# Patient Record
Sex: FEMALE | Race: OTHER
[De-identification: names, ages, dates, MRNs, and addresses within clinical notes are randomized per-mention and may not be internally consistent; named-entity substitution may affect disease eponyms.]

---

## 2017-12-14 ENCOUNTER — HOSPITAL ENCOUNTER (EMERGENCY)
Dept: HOSPITAL 12 - ER | Age: 37
Discharge: HOME | End: 2017-12-14
Payer: COMMERCIAL

## 2017-12-14 VITALS — WEIGHT: 160 LBS | HEIGHT: 63 IN | BODY MASS INDEX: 28.35 KG/M2

## 2017-12-14 VITALS — DIASTOLIC BLOOD PRESSURE: 83 MMHG | SYSTOLIC BLOOD PRESSURE: 140 MMHG

## 2017-12-14 DIAGNOSIS — M54.5: ICD-10-CM

## 2017-12-14 DIAGNOSIS — K21.9: ICD-10-CM

## 2017-12-14 DIAGNOSIS — E03.9: ICD-10-CM

## 2017-12-14 DIAGNOSIS — B96.89: ICD-10-CM

## 2017-12-14 DIAGNOSIS — N39.0: Primary | ICD-10-CM

## 2017-12-14 DIAGNOSIS — F17.200: ICD-10-CM

## 2017-12-14 DIAGNOSIS — F42.9: ICD-10-CM

## 2017-12-14 LAB
APPEARANCE UR: (no result)
BASOPHILS # BLD AUTO: 0 K/UL (ref 0–8)
BASOPHILS NFR BLD AUTO: 0.7 % (ref 0–2)
BILIRUB UR QL STRIP: NEGATIVE
COLOR UR: YELLOW
DEPRECATED SQUAMOUS URNS QL MICRO: (no result) /HPF
EOSINOPHIL # BLD AUTO: 0.2 K/UL (ref 0–0.7)
EOSINOPHIL NFR BLD AUTO: 3.2 % (ref 0–7)
EOSINOPHIL NFR BLD MANUAL: 3 % (ref 0–8)
GLUCOSE UR STRIP-MCNC: NEGATIVE MG/DL
HCG UR QL: NEGATIVE
HCT VFR BLD AUTO: 28.5 % (ref 31.2–41.9)
HGB BLD-MCNC: 9.1 G/DL (ref 10.9–14.3)
HGB UR QL STRIP: NEGATIVE
KETONES UR STRIP-MCNC: NEGATIVE MG/DL
LEUKOCYTE ESTERASE UR QL STRIP: (no result)
LYMPHOCYTES # BLD AUTO: 2.3 K/UL (ref 20–40)
LYMPHOCYTES NFR BLD AUTO: 32.3 % (ref 20.5–51.5)
LYMPHOCYTES NFR BLD MANUAL: 29 % (ref 20–40)
MCH RBC QN AUTO: 23 UUG (ref 24.7–32.8)
MCHC RBC AUTO-ENTMCNC: 32 G/DL (ref 32.3–35.6)
MCV RBC AUTO: 71.8 FL (ref 75.5–95.3)
MONOCYTES # BLD AUTO: 0.6 K/UL (ref 2–10)
MONOCYTES NFR BLD AUTO: 8.8 % (ref 0–11)
MONOCYTES NFR BLD MANUAL: 9 % (ref 2–10)
MUCOUS THREADS URNS QL MICRO: (no result) /LPF
NEUTROPHILS # BLD AUTO: 4 K/UL (ref 1.8–8.9)
NEUTROPHILS NFR BLD AUTO: 55 % (ref 38.5–71.5)
NEUTS BAND NFR BLD MANUAL: 3 % (ref 0–10)
NEUTS SEG NFR BLD MANUAL: 56 % (ref 42–75)
NITRITE UR QL STRIP: NEGATIVE
PH UR STRIP: 6 [PH] (ref 5–8)
PLATELET # BLD AUTO: 278 K/UL (ref 179–408)
PROT UR QL STRIP: (no result)
RBC # BLD AUTO: 3.96 MIL/UL (ref 3.63–4.92)
RBC #/AREA URNS HPF: (no result) /HPF (ref 0–3)
SP GR UR STRIP: 1.03 (ref 1–1.03)
UROBILINOGEN UR STRIP-MCNC: 0.2 E.U./DL
WBC # BLD AUTO: 7.2 K/UL (ref 3.8–11.8)
WBC #/AREA URNS HPF: (no result) /HPF
WBC #/AREA URNS HPF: (no result) /HPF (ref 0–3)

## 2017-12-14 PROCEDURE — A4663 DIALYSIS BLOOD PRESSURE CUFF: HCPCS

## 2018-03-02 ENCOUNTER — HOSPITAL ENCOUNTER (EMERGENCY)
Dept: HOSPITAL 12 - ER | Age: 38
LOS: 1 days | Discharge: HOME | End: 2018-03-03
Payer: COMMERCIAL

## 2018-03-02 VITALS — HEIGHT: 63 IN | WEIGHT: 150 LBS | BODY MASS INDEX: 26.58 KG/M2

## 2018-03-02 DIAGNOSIS — Z79.1: ICD-10-CM

## 2018-03-02 DIAGNOSIS — J10.1: Primary | ICD-10-CM

## 2018-03-02 DIAGNOSIS — Z79.899: ICD-10-CM

## 2018-03-02 DIAGNOSIS — K21.9: ICD-10-CM

## 2018-03-02 DIAGNOSIS — F17.210: ICD-10-CM

## 2018-03-02 DIAGNOSIS — E03.9: ICD-10-CM

## 2018-03-02 LAB
APPEARANCE UR: CLEAR
BILIRUB UR QL STRIP: NEGATIVE
COLOR UR: YELLOW
DEPRECATED SQUAMOUS URNS QL MICRO: (no result) /HPF
GLUCOSE UR STRIP-MCNC: NEGATIVE MG/DL
HCG UR QL: NEGATIVE
HGB UR QL STRIP: NEGATIVE
KETONES UR STRIP-MCNC: (no result) MG/DL
LEUKOCYTE ESTERASE UR QL STRIP: NEGATIVE
NITRITE UR QL STRIP: NEGATIVE
PH UR STRIP: 6 [PH] (ref 5–8)
PROT UR QL STRIP: (no result)
RBC #/AREA URNS HPF: (no result) /HPF (ref 0–3)
SP GR UR STRIP: 1.02 (ref 1–1.03)
UROBILINOGEN UR STRIP-MCNC: 0.2 E.U./DL
WBC #/AREA URNS HPF: (no result) /HPF
WBC #/AREA URNS HPF: (no result) /HPF (ref 0–3)

## 2018-03-02 PROCEDURE — A4663 DIALYSIS BLOOD PRESSURE CUFF: HCPCS

## 2018-03-03 VITALS — DIASTOLIC BLOOD PRESSURE: 79 MMHG | SYSTOLIC BLOOD PRESSURE: 120 MMHG

## 2018-03-03 NOTE — NUR
Patient discharged to home in stable conditon.  Written and verbal after care 
instructions given. 

Patient verbalizes understanding of instructions. Patient ambulated out of ER 
with steady gait, lab records provided per patient request, VSS, no acute signs 
of distress.

## 2018-05-11 ENCOUNTER — HOSPITAL ENCOUNTER (EMERGENCY)
Dept: HOSPITAL 12 - ER | Age: 38
Discharge: HOME | End: 2018-05-11
Payer: COMMERCIAL

## 2018-05-11 VITALS — WEIGHT: 152 LBS | HEIGHT: 63 IN | BODY MASS INDEX: 26.93 KG/M2

## 2018-05-11 VITALS — SYSTOLIC BLOOD PRESSURE: 115 MMHG | DIASTOLIC BLOOD PRESSURE: 65 MMHG

## 2018-05-11 DIAGNOSIS — E03.9: ICD-10-CM

## 2018-05-11 DIAGNOSIS — L03.114: ICD-10-CM

## 2018-05-11 DIAGNOSIS — Z79.899: ICD-10-CM

## 2018-05-11 DIAGNOSIS — F17.210: ICD-10-CM

## 2018-05-11 DIAGNOSIS — Z79.1: ICD-10-CM

## 2018-05-11 DIAGNOSIS — K21.9: ICD-10-CM

## 2018-05-11 DIAGNOSIS — L03.113: Primary | ICD-10-CM

## 2018-05-11 PROCEDURE — A4663 DIALYSIS BLOOD PRESSURE CUFF: HCPCS

## 2018-05-11 NOTE — NUR
Patient discharged to home in stable conditon.  Written and verbal after care 
instructions given. 

Patient verbalizes understanding of instructions. Patient ambulated out of ER 
with steady gait, no acute signs of distress, VSS, all belongings taken, IV 
site discontinued.

## 2018-05-11 NOTE — NUR
Patient concerned about increasing pain and redness on arms, reports has 
trouble getting pain medications in [facility], requesting for more pain meds and has 
some questions for the doctor. MD notified.

## 2018-05-14 ENCOUNTER — HOSPITAL ENCOUNTER (EMERGENCY)
Dept: HOSPITAL 12 - ER | Age: 38
LOS: 1 days | Discharge: HOME | End: 2018-05-15
Payer: SELF-PAY

## 2018-05-14 VITALS — HEIGHT: 63 IN | BODY MASS INDEX: 26.58 KG/M2 | WEIGHT: 150 LBS

## 2018-05-14 DIAGNOSIS — L02.414: Primary | ICD-10-CM

## 2018-05-14 DIAGNOSIS — E03.9: ICD-10-CM

## 2018-05-14 DIAGNOSIS — K21.9: ICD-10-CM

## 2018-05-14 DIAGNOSIS — F17.200: ICD-10-CM

## 2018-05-14 DIAGNOSIS — L02.413: ICD-10-CM

## 2018-05-14 PROCEDURE — A4663 DIALYSIS BLOOD PRESSURE CUFF: HCPCS

## 2018-05-14 NOTE — NUR
Patient is AAox4. Able to speak in complete sentences. Responsive to verbal and 
tactile stimuli. Patient states she was given a prescription for Adderall 1  
month ago. She began picking at her skin, and developed multiple areas of open 
skin to both arms. She has bilat. forearm abscess at this time. She was seen 
recently at this ER for the same issue, and was given antibiotics. The 
medication is helping the patient per her statement. She comes in now 
requesting I&D of both abscess , and requesting analgesics. Patient denies 
fevers, vomiting, nausea, chest pain, respiratory distress at this time. 
Respirations even and unlabored. no cardiovascular distress noted. no GI/ 
distress noted. Will continue to monitor patient.

## 2018-05-15 VITALS — SYSTOLIC BLOOD PRESSURE: 137 MMHG | DIASTOLIC BLOOD PRESSURE: 82 MMHG

## 2018-05-15 NOTE — NUR
Patient discharged to home in stable conditon.  Written and verbal after care 
instructions given. 

Patient verbalizes understanding of instructions. Ambulated from Er with stable 
gait. All belongings with patient. Patient to be driven home by  in 
private vehicle.

## 2018-05-16 ENCOUNTER — HOSPITAL ENCOUNTER (EMERGENCY)
Dept: HOSPITAL 12 - ER | Age: 38
Discharge: HOME | End: 2018-05-16
Payer: SELF-PAY

## 2018-05-16 VITALS — DIASTOLIC BLOOD PRESSURE: 71 MMHG | SYSTOLIC BLOOD PRESSURE: 133 MMHG

## 2018-05-16 VITALS — HEIGHT: 63 IN | WEIGHT: 145 LBS | BODY MASS INDEX: 25.69 KG/M2

## 2018-05-16 DIAGNOSIS — L03.114: Primary | ICD-10-CM

## 2018-05-16 DIAGNOSIS — F17.200: ICD-10-CM

## 2018-05-16 PROCEDURE — A4217 STERILE WATER/SALINE, 500 ML: HCPCS

## 2018-05-16 PROCEDURE — A4663 DIALYSIS BLOOD PRESSURE CUFF: HCPCS

## 2018-05-16 NOTE — NUR
Patient discharged to home in stable conditon.  Written and verbal after care 
instructions given. 

Patient verbalizes understanding of instructions.  Patient left ER and walks in 
steady gait.  Accompanied by mother who will drive home.  All belongings with 
pt. VSS. No acute distress noted.

## 2020-12-21 ENCOUNTER — HOSPITAL ENCOUNTER (EMERGENCY)
Dept: HOSPITAL 12 - ER | Age: 40
LOS: 1 days | Discharge: HOME | End: 2020-12-22
Payer: COMMERCIAL

## 2020-12-21 VITALS — HEIGHT: 63 IN | BODY MASS INDEX: 35.44 KG/M2 | WEIGHT: 200 LBS

## 2020-12-21 DIAGNOSIS — E03.9: ICD-10-CM

## 2020-12-21 DIAGNOSIS — D64.9: ICD-10-CM

## 2020-12-21 DIAGNOSIS — F42.9: ICD-10-CM

## 2020-12-21 DIAGNOSIS — Z79.890: ICD-10-CM

## 2020-12-21 DIAGNOSIS — F41.9: ICD-10-CM

## 2020-12-21 DIAGNOSIS — Z84.1: ICD-10-CM

## 2020-12-21 DIAGNOSIS — K76.0: ICD-10-CM

## 2020-12-21 DIAGNOSIS — R00.0: ICD-10-CM

## 2020-12-21 DIAGNOSIS — K21.9: ICD-10-CM

## 2020-12-21 DIAGNOSIS — R10.11: Primary | ICD-10-CM

## 2020-12-21 DIAGNOSIS — N20.0: ICD-10-CM

## 2020-12-21 LAB
ALP SERPL-CCNC: 97 U/L (ref 50–136)
ALT SERPL W/O P-5'-P-CCNC: 146 U/L (ref 14–59)
APPEARANCE UR: CLEAR
AST SERPL-CCNC: 61 U/L (ref 15–37)
BASOPHILS # BLD AUTO: 0 K/UL (ref 0–8)
BASOPHILS NFR BLD AUTO: 0.6 % (ref 0–2)
BILIRUB DIRECT SERPL-MCNC: 0.1 MG/DL (ref 0–0.2)
BILIRUB SERPL-MCNC: 0.2 MG/DL (ref 0.2–1)
BILIRUB UR QL STRIP: NEGATIVE
BUN SERPL-MCNC: 19 MG/DL (ref 7–18)
CHLORIDE SERPL-SCNC: 101 MMOL/L (ref 98–107)
CO2 SERPL-SCNC: 21 MMOL/L (ref 21–32)
COLOR UR: YELLOW
CREAT SERPL-MCNC: 1.1 MG/DL (ref 0.6–1.3)
DEPRECATED SQUAMOUS URNS QL MICRO: (no result) /HPF
EOSINOPHIL # BLD AUTO: 0.1 K/UL (ref 0–0.7)
EOSINOPHIL NFR BLD AUTO: 0.8 % (ref 0–7)
GLUCOSE SERPL-MCNC: 132 MG/DL (ref 74–106)
GLUCOSE UR STRIP-MCNC: NEGATIVE MG/DL
HCG UR QL: NEGATIVE
HCT VFR BLD AUTO: 31.1 % (ref 31.2–41.9)
HGB BLD-MCNC: 10 G/DL (ref 10.9–14.3)
HGB UR QL STRIP: NEGATIVE
KETONES UR STRIP-MCNC: (no result) MG/DL
LEUKOCYTE ESTERASE UR QL STRIP: NEGATIVE
LIPASE SERPL-CCNC: 158 U/L (ref 73–393)
LYMPHOCYTES # BLD AUTO: 1.5 K/UL (ref 20–40)
LYMPHOCYTES NFR BLD AUTO: 20.2 % (ref 20.5–51.5)
MCH RBC QN AUTO: 22.1 UUG (ref 24.7–32.8)
MCHC RBC AUTO-ENTMCNC: 32 G/DL (ref 32.3–35.6)
MCV RBC AUTO: 68.9 FL (ref 75.5–95.3)
MONOCYTES # BLD AUTO: 0.5 K/UL (ref 2–10)
MONOCYTES NFR BLD AUTO: 7 % (ref 0–11)
NEUTROPHILS # BLD AUTO: 5.4 K/UL (ref 1.8–8.9)
NEUTROPHILS NFR BLD AUTO: 71.4 % (ref 38.5–71.5)
NITRITE UR QL STRIP: NEGATIVE
PH UR STRIP: 6 [PH] (ref 5–8)
PLATELET # BLD AUTO: 297 K/UL (ref 179–408)
POTASSIUM SERPL-SCNC: 4 MMOL/L (ref 3.5–5.1)
RBC # BLD AUTO: 4.52 MIL/UL (ref 3.63–4.92)
RBC #/AREA URNS HPF: (no result) /HPF (ref 0–3)
SP GR UR STRIP: 1.02 (ref 1–1.03)
UROBILINOGEN UR STRIP-MCNC: 0.2 E.U./DL
WBC # BLD AUTO: 7.6 K/UL (ref 3.8–11.8)
WBC #/AREA URNS HPF: (no result) /HPF
WBC #/AREA URNS HPF: (no result) /HPF (ref 0–3)
WS STN SPEC: 7.8 G/DL (ref 6.4–8.2)

## 2020-12-21 PROCEDURE — 84484 ASSAY OF TROPONIN QUANT: CPT

## 2020-12-21 PROCEDURE — 84703 CHORIONIC GONADOTROPIN ASSAY: CPT

## 2020-12-21 PROCEDURE — 36415 COLL VENOUS BLD VENIPUNCTURE: CPT

## 2020-12-21 PROCEDURE — 76705 ECHO EXAM OF ABDOMEN: CPT

## 2020-12-21 PROCEDURE — 85007 BL SMEAR W/DIFF WBC COUNT: CPT

## 2020-12-21 PROCEDURE — 84443 ASSAY THYROID STIM HORMONE: CPT

## 2020-12-21 PROCEDURE — 96374 THER/PROPH/DIAG INJ IV PUSH: CPT

## 2020-12-21 PROCEDURE — 96376 TX/PRO/DX INJ SAME DRUG ADON: CPT

## 2020-12-21 PROCEDURE — 80048 BASIC METABOLIC PNL TOTAL CA: CPT

## 2020-12-21 PROCEDURE — A4663 DIALYSIS BLOOD PRESSURE CUFF: HCPCS

## 2020-12-21 PROCEDURE — 93005 ELECTROCARDIOGRAM TRACING: CPT

## 2020-12-21 PROCEDURE — 81001 URINALYSIS AUTO W/SCOPE: CPT

## 2020-12-21 PROCEDURE — 80076 HEPATIC FUNCTION PANEL: CPT

## 2020-12-21 PROCEDURE — 96361 HYDRATE IV INFUSION ADD-ON: CPT

## 2020-12-21 PROCEDURE — 85025 COMPLETE CBC W/AUTO DIFF WBC: CPT

## 2020-12-21 PROCEDURE — 83690 ASSAY OF LIPASE: CPT

## 2020-12-21 PROCEDURE — 96375 TX/PRO/DX INJ NEW DRUG ADDON: CPT

## 2020-12-21 PROCEDURE — 99285 EMERGENCY DEPT VISIT HI MDM: CPT

## 2020-12-22 VITALS — SYSTOLIC BLOOD PRESSURE: 100 MMHG | DIASTOLIC BLOOD PRESSURE: 67 MMHG

## 2020-12-22 LAB
LYMPHOCYTES NFR BLD MANUAL: 15 % (ref 20–40)
MONOCYTES NFR BLD MANUAL: 7 % (ref 2–10)
NEUTS SEG NFR BLD MANUAL: 78 % (ref 42–75)

## 2020-12-22 NOTE — NUR
Patient discharged to home in stable condition with  taking patient 
home.  Written and verbal after care instructions given. 

Patient verbalizes understanding of instructions. Stressed follow up or return 
to ER for worsening s/s.

## 2021-05-31 ENCOUNTER — HOSPITAL ENCOUNTER (EMERGENCY)
Dept: HOSPITAL 54 - ER | Age: 41
Discharge: HOME | End: 2021-05-31
Payer: MEDICAID

## 2021-05-31 VITALS — WEIGHT: 200 LBS | HEIGHT: 65 IN | BODY MASS INDEX: 33.32 KG/M2

## 2021-05-31 VITALS — DIASTOLIC BLOOD PRESSURE: 83 MMHG | SYSTOLIC BLOOD PRESSURE: 123 MMHG

## 2021-05-31 DIAGNOSIS — F10.139: Primary | ICD-10-CM
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
## 2021-05-31 DIAGNOSIS — F32.9: ICD-10-CM

## 2021-05-31 DIAGNOSIS — F41.9: ICD-10-CM

## 2021-06-01 ENCOUNTER — HOSPITAL ENCOUNTER (EMERGENCY)
Dept: HOSPITAL 12 - ER | Age: 41
Discharge: HOME | End: 2021-06-01
Payer: COMMERCIAL

## 2021-06-01 VITALS — WEIGHT: 200 LBS | BODY MASS INDEX: 35.44 KG/M2 | HEIGHT: 63 IN

## 2021-06-01 DIAGNOSIS — F42.9: ICD-10-CM

## 2021-06-01 DIAGNOSIS — E03.9: ICD-10-CM

## 2021-06-01 DIAGNOSIS — R10.9: Primary | ICD-10-CM

## 2021-06-01 DIAGNOSIS — Z84.1: ICD-10-CM

## 2021-06-01 DIAGNOSIS — Z79.890: ICD-10-CM

## 2021-06-01 DIAGNOSIS — F41.9: ICD-10-CM

## 2021-06-01 DIAGNOSIS — K57.30: ICD-10-CM

## 2021-06-01 DIAGNOSIS — Z97.5: ICD-10-CM

## 2021-06-01 DIAGNOSIS — K21.9: ICD-10-CM

## 2021-06-01 DIAGNOSIS — K43.9: ICD-10-CM

## 2021-06-01 DIAGNOSIS — Z79.899: ICD-10-CM

## 2021-06-01 DIAGNOSIS — Z87.442: ICD-10-CM

## 2021-06-01 LAB
ALP SERPL-CCNC: 93 U/L (ref 50–136)
ALT SERPL W/O P-5'-P-CCNC: 120 U/L (ref 14–59)
APPEARANCE UR: CLEAR
AST SERPL-CCNC: 31 U/L (ref 15–37)
BASOPHILS # BLD AUTO: 0 K/UL (ref 0–8)
BASOPHILS NFR BLD AUTO: 0.5 % (ref 0–2)
BILIRUB DIRECT SERPL-MCNC: 0.1 MG/DL (ref 0–0.2)
BILIRUB SERPL-MCNC: 0.2 MG/DL (ref 0.2–1)
BILIRUB UR QL STRIP: NEGATIVE
BUN SERPL-MCNC: 5 MG/DL (ref 7–18)
CHLORIDE SERPL-SCNC: 99 MMOL/L (ref 98–107)
CO2 SERPL-SCNC: 27 MMOL/L (ref 21–32)
COLOR UR: YELLOW
CREAT SERPL-MCNC: 1.6 MG/DL (ref 0.6–1.3)
EOSINOPHIL # BLD AUTO: 0 K/UL (ref 0–0.7)
EOSINOPHIL NFR BLD AUTO: 0.5 % (ref 0–7)
GLUCOSE SERPL-MCNC: 121 MG/DL (ref 74–106)
GLUCOSE UR STRIP-MCNC: NEGATIVE MG/DL
HCG UR QL: NEGATIVE
HCT VFR BLD AUTO: 40.3 % (ref 31.2–41.9)
HGB BLD-MCNC: 13.2 G/DL (ref 10.9–14.3)
HGB UR QL STRIP: NEGATIVE
KETONES UR STRIP-MCNC: NEGATIVE MG/DL
LEUKOCYTE ESTERASE UR QL STRIP: NEGATIVE
LIPASE SERPL-CCNC: 126 U/L (ref 73–393)
LYMPHOCYTES # BLD AUTO: 2.4 K/UL (ref 20–40)
LYMPHOCYTES NFR BLD AUTO: 26.8 % (ref 20.5–51.5)
MCH RBC QN AUTO: 27.2 UUG (ref 24.7–32.8)
MCHC RBC AUTO-ENTMCNC: 33 G/DL (ref 32.3–35.6)
MCV RBC AUTO: 82.9 FL (ref 75.5–95.3)
MONOCYTES # BLD AUTO: 0.6 K/UL (ref 2–10)
MONOCYTES NFR BLD AUTO: 6.5 % (ref 0–11)
NEUTROPHILS # BLD AUTO: 5.8 K/UL (ref 1.8–8.9)
NEUTROPHILS NFR BLD AUTO: 65.7 % (ref 38.5–71.5)
NITRITE UR QL STRIP: NEGATIVE
PH UR STRIP: 7 [PH] (ref 5–8)
PLATELET # BLD AUTO: 377 K/UL (ref 179–408)
POTASSIUM SERPL-SCNC: 3.8 MMOL/L (ref 3.5–5.1)
RBC # BLD AUTO: 4.86 MIL/UL (ref 3.63–4.92)
SP GR UR STRIP: 1.02 (ref 1–1.03)
UROBILINOGEN UR STRIP-MCNC: 0.2 E.U./DL
WBC # BLD AUTO: 8.8 K/UL (ref 3.8–11.8)
WS STN SPEC: 7.8 G/DL (ref 6.4–8.2)

## 2021-06-01 PROCEDURE — 36415 COLL VENOUS BLD VENIPUNCTURE: CPT

## 2021-06-01 PROCEDURE — 74176 CT ABD & PELVIS W/O CONTRAST: CPT

## 2021-06-01 PROCEDURE — 96374 THER/PROPH/DIAG INJ IV PUSH: CPT

## 2021-06-01 PROCEDURE — 85025 COMPLETE CBC W/AUTO DIFF WBC: CPT

## 2021-06-01 PROCEDURE — 80076 HEPATIC FUNCTION PANEL: CPT

## 2021-06-01 PROCEDURE — 84443 ASSAY THYROID STIM HORMONE: CPT

## 2021-06-01 PROCEDURE — 99284 EMERGENCY DEPT VISIT MOD MDM: CPT

## 2021-06-01 PROCEDURE — 83690 ASSAY OF LIPASE: CPT

## 2021-06-01 PROCEDURE — 96361 HYDRATE IV INFUSION ADD-ON: CPT

## 2021-06-01 PROCEDURE — 96376 TX/PRO/DX INJ SAME DRUG ADON: CPT

## 2021-06-01 PROCEDURE — 80048 BASIC METABOLIC PNL TOTAL CA: CPT

## 2021-06-01 PROCEDURE — A4663 DIALYSIS BLOOD PRESSURE CUFF: HCPCS

## 2021-06-01 PROCEDURE — 96375 TX/PRO/DX INJ NEW DRUG ADDON: CPT

## 2021-06-01 PROCEDURE — 84703 CHORIONIC GONADOTROPIN ASSAY: CPT

## 2021-06-01 PROCEDURE — 81003 URINALYSIS AUTO W/O SCOPE: CPT

## 2021-06-01 NOTE — NUR
before been dcd home pt. requesting more pain medication and zofran for nausea, 
medicated as ordered.

## 2021-06-01 NOTE — NUR
dcd instructions with prescription provided to pt. who left room AAOx4. 

-------------------------------------------------------------------------------

Addendum: 06/01/21 at 1829 by ANA

-------------------------------------------------------------------------------

pt remains in the room with c/of pain and nausea to MD.

## 2021-08-08 ENCOUNTER — HOSPITAL ENCOUNTER (EMERGENCY)
Dept: HOSPITAL 12 - ER | Age: 41
LOS: 1 days | Discharge: HOME | End: 2021-08-09
Payer: MEDICAID

## 2021-08-08 VITALS — HEIGHT: 63 IN | BODY MASS INDEX: 37.21 KG/M2 | WEIGHT: 210 LBS
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
## 2021-08-08 DIAGNOSIS — F10.129: ICD-10-CM

## 2021-08-08 DIAGNOSIS — Z20.822: ICD-10-CM

## 2021-08-08 DIAGNOSIS — E87.6: ICD-10-CM

## 2021-08-08 DIAGNOSIS — E03.9: ICD-10-CM

## 2021-08-08 DIAGNOSIS — M79.10: Primary | ICD-10-CM

## 2021-08-08 LAB
ALP SERPL-CCNC: 87 U/L (ref 50–136)
ALT SERPL W/O P-5'-P-CCNC: 121 U/L (ref 14–59)
AMPHETAMINES UR QL SCN>1000 NG/ML: NEGATIVE
APPEARANCE UR: CLEAR
AST SERPL-CCNC: 49 U/L (ref 15–37)
BILIRUB DIRECT SERPL-MCNC: 0.1 MG/DL (ref 0–0.2)
BILIRUB SERPL-MCNC: 0.2 MG/DL (ref 0.2–1)
BILIRUB UR QL STRIP: NEGATIVE
BUN SERPL-MCNC: 14 MG/DL (ref 7–18)
CHLORIDE SERPL-SCNC: 102 MMOL/L (ref 98–107)
CK SERPL-CCNC: 123 U/L (ref 26–192)
CO2 SERPL-SCNC: 24 MMOL/L (ref 21–32)
COCAINE UR QL SCN: NEGATIVE
COLOR UR: YELLOW
CREAT SERPL-MCNC: 0.8 MG/DL (ref 0.6–1.3)
ETHANOL SERPL-MCNC: 148 MG/DL (ref 0–0)
GLUCOSE SERPL-MCNC: 138 MG/DL (ref 74–106)
GLUCOSE UR STRIP-MCNC: NEGATIVE MG/DL
HCT VFR BLD AUTO: 40.3 % (ref 31.2–41.9)
HGB UR QL STRIP: (no result)
KETONES UR STRIP-MCNC: NEGATIVE MG/DL
LEUKOCYTE ESTERASE UR QL STRIP: NEGATIVE
LIPASE SERPL-CCNC: 183 U/L (ref 73–393)
MCH RBC QN AUTO: 30.1 UUG (ref 24.7–32.8)
MCV RBC AUTO: 88.6 FL (ref 75.5–95.3)
NITRITE UR QL STRIP: NEGATIVE
OPIATES UR QL SCN: NEGATIVE
PCP UR QL SCN>25 NG/ML: NEGATIVE
PH UR STRIP: 5.5 [PH] (ref 5–8)
PLATELET # BLD AUTO: 348 K/UL (ref 179–408)
POTASSIUM SERPL-SCNC: 3.3 MMOL/L (ref 3.5–5.1)
RBC #/AREA URNS HPF: (no result) /HPF (ref 0–3)
SP GR UR STRIP: 1.01 (ref 1–1.03)
THC UR QL SCN>50 NG/ML: POSITIVE
TSH SERPL DL<=0.005 MIU/L-ACNC: 40.95 MIU/ML (ref 0.36–3.74)
UROBILINOGEN UR STRIP-MCNC: 0.2 E.U./DL
WBC #/AREA URNS HPF: (no result) /HPF
WBC #/AREA URNS HPF: (no result) /HPF (ref 0–3)
WS STN SPEC: 7.4 G/DL (ref 6.4–8.2)

## 2021-08-08 PROCEDURE — 81001 URINALYSIS AUTO W/SCOPE: CPT

## 2021-08-08 PROCEDURE — 96375 TX/PRO/DX INJ NEW DRUG ADDON: CPT

## 2021-08-08 PROCEDURE — 80307 DRUG TEST PRSMV CHEM ANLYZR: CPT

## 2021-08-08 PROCEDURE — 96374 THER/PROPH/DIAG INJ IV PUSH: CPT

## 2021-08-08 PROCEDURE — G0480 DRUG TEST DEF 1-7 CLASSES: HCPCS

## 2021-08-08 PROCEDURE — 96361 HYDRATE IV INFUSION ADD-ON: CPT

## 2021-08-08 PROCEDURE — 36415 COLL VENOUS BLD VENIPUNCTURE: CPT

## 2021-08-08 PROCEDURE — 80320 DRUG SCREEN QUANTALCOHOLS: CPT

## 2021-08-08 PROCEDURE — 84702 CHORIONIC GONADOTROPIN TEST: CPT

## 2021-08-08 PROCEDURE — 84439 ASSAY OF FREE THYROXINE: CPT

## 2021-08-08 PROCEDURE — 82550 ASSAY OF CK (CPK): CPT

## 2021-08-08 PROCEDURE — 87426 SARSCOV CORONAVIRUS AG IA: CPT

## 2021-08-08 PROCEDURE — 99285 EMERGENCY DEPT VISIT HI MDM: CPT

## 2021-08-08 PROCEDURE — 83690 ASSAY OF LIPASE: CPT

## 2021-08-08 PROCEDURE — 93005 ELECTROCARDIOGRAM TRACING: CPT

## 2021-08-08 PROCEDURE — 85025 COMPLETE CBC W/AUTO DIFF WBC: CPT

## 2021-08-08 PROCEDURE — 80076 HEPATIC FUNCTION PANEL: CPT

## 2021-08-08 PROCEDURE — 80048 BASIC METABOLIC PNL TOTAL CA: CPT

## 2021-08-08 PROCEDURE — 84443 ASSAY THYROID STIM HORMONE: CPT

## 2021-08-08 NOTE — NUR
Pt. resting in bed, dozing intermittently. Pts. spo2 was dropping 
intermittently to 92-93% on room air. Pts. current sp02 maintained at 100% on 
1L oxygen nasal cannula.

## 2021-08-08 NOTE — NUR
Pt. c/o headache, body aches and anxiety past 2 days. Pt. denies cough, sob, 
fever, or other symptoms. Pt. afebrile.

## 2021-08-09 VITALS — SYSTOLIC BLOOD PRESSURE: 138 MMHG | DIASTOLIC BLOOD PRESSURE: 79 MMHG

## 2021-08-09 NOTE — NUR
Patient discharged to home in stable condition.  Written and verbal after care 
instructions given. 

Patient verbalizes understanding of instructions. Stressed follow up or return 
to ER for worsening s/s.

Pt. walks with steady gait. No signs of distress. Vss. All belongings taken.

## 2021-12-13 ENCOUNTER — HOSPITAL ENCOUNTER (EMERGENCY)
Dept: HOSPITAL 12 - ER | Age: 41
Discharge: HOME | End: 2021-12-13
Payer: MEDICAID

## 2021-12-13 VITALS — HEIGHT: 63 IN | BODY MASS INDEX: 37.21 KG/M2 | WEIGHT: 210 LBS

## 2021-12-13 VITALS — DIASTOLIC BLOOD PRESSURE: 81 MMHG | SYSTOLIC BLOOD PRESSURE: 131 MMHG

## 2021-12-13 DIAGNOSIS — F41.9: ICD-10-CM

## 2021-12-13 DIAGNOSIS — Z79.890: ICD-10-CM

## 2021-12-13 DIAGNOSIS — Z79.899: ICD-10-CM

## 2021-12-13 DIAGNOSIS — Z98.84: ICD-10-CM

## 2021-12-13 DIAGNOSIS — R07.9: Primary | ICD-10-CM

## 2021-12-13 DIAGNOSIS — E03.9: ICD-10-CM

## 2021-12-13 DIAGNOSIS — F17.210: ICD-10-CM

## 2021-12-13 LAB
BUN SERPL-MCNC: 15 MG/DL (ref 7–18)
CHLORIDE SERPL-SCNC: 102 MMOL/L (ref 98–107)
CO2 SERPL-SCNC: 29 MMOL/L (ref 21–32)
CREAT SERPL-MCNC: 0.8 MG/DL (ref 0.6–1.3)
GLUCOSE SERPL-MCNC: 106 MG/DL (ref 74–106)
HCT VFR BLD AUTO: 37.7 % (ref 31.2–41.9)
MCH RBC QN AUTO: 28.3 UUG (ref 24.7–32.8)
MCV RBC AUTO: 84.5 FL (ref 75.5–95.3)
PLATELET # BLD AUTO: 297 K/UL (ref 179–408)
POTASSIUM SERPL-SCNC: 3.7 MMOL/L (ref 3.5–5.1)

## 2021-12-13 PROCEDURE — 80048 BASIC METABOLIC PNL TOTAL CA: CPT

## 2021-12-13 PROCEDURE — 85025 COMPLETE CBC W/AUTO DIFF WBC: CPT

## 2021-12-13 PROCEDURE — 93005 ELECTROCARDIOGRAM TRACING: CPT

## 2021-12-13 PROCEDURE — 96372 THER/PROPH/DIAG INJ SC/IM: CPT

## 2021-12-13 PROCEDURE — 36415 COLL VENOUS BLD VENIPUNCTURE: CPT

## 2021-12-13 PROCEDURE — 99285 EMERGENCY DEPT VISIT HI MDM: CPT

## 2021-12-13 PROCEDURE — A4663 DIALYSIS BLOOD PRESSURE CUFF: HCPCS

## 2021-12-13 PROCEDURE — 71045 X-RAY EXAM CHEST 1 VIEW: CPT

## 2021-12-13 PROCEDURE — 84484 ASSAY OF TROPONIN QUANT: CPT

## 2022-09-18 ENCOUNTER — HOSPITAL ENCOUNTER (INPATIENT)
Dept: HOSPITAL 12 - ER | Age: 42
LOS: 2 days | Discharge: HOME | DRG: 720 | End: 2022-09-20
Payer: MEDICAID

## 2022-09-18 VITALS — BODY MASS INDEX: 31.89 KG/M2 | WEIGHT: 180 LBS | HEIGHT: 63 IN

## 2022-09-18 DIAGNOSIS — Z20.822: ICD-10-CM

## 2022-09-18 DIAGNOSIS — Z98.84: ICD-10-CM

## 2022-09-18 DIAGNOSIS — A41.9: Primary | ICD-10-CM

## 2022-09-18 DIAGNOSIS — F31.9: ICD-10-CM

## 2022-09-18 DIAGNOSIS — Z86.19: ICD-10-CM

## 2022-09-18 DIAGNOSIS — F42.9: ICD-10-CM

## 2022-09-18 DIAGNOSIS — K21.9: ICD-10-CM

## 2022-09-18 DIAGNOSIS — J02.0: ICD-10-CM

## 2022-09-18 DIAGNOSIS — E03.9: ICD-10-CM

## 2022-09-18 DIAGNOSIS — F41.9: ICD-10-CM

## 2022-09-18 LAB
ALP SERPL-CCNC: 72 U/L (ref 50–136)
ALT SERPL W/O P-5'-P-CCNC: 28 U/L (ref 14–59)
AST SERPL-CCNC: 11 U/L (ref 15–37)
BILIRUB SERPL-MCNC: 0.2 MG/DL (ref 0.2–1)
BUN SERPL-MCNC: 15 MG/DL (ref 7–18)
CHLORIDE SERPL-SCNC: 104 MMOL/L (ref 98–107)
CO2 SERPL-SCNC: 30 MMOL/L (ref 21–32)
CREAT SERPL-MCNC: 1.1 MG/DL (ref 0.6–1.3)
GLUCOSE SERPL-MCNC: 111 MG/DL (ref 74–106)
HCT VFR BLD AUTO: 37 % (ref 31.2–41.9)
HETEROPH AB SER QL LA: NEGATIVE
MCH RBC QN AUTO: 26.6 UUG (ref 24.7–32.8)
MCV RBC AUTO: 80 FL (ref 75.5–95.3)
PLATELET # BLD AUTO: 320 K/UL (ref 179–408)
POTASSIUM SERPL-SCNC: 3.9 MMOL/L (ref 3.5–5.1)
WS STN SPEC: 7.5 G/DL (ref 6.4–8.2)

## 2022-09-18 PROCEDURE — G0378 HOSPITAL OBSERVATION PER HR: HCPCS

## 2022-09-18 PROCEDURE — A4663 DIALYSIS BLOOD PRESSURE CUFF: HCPCS

## 2022-09-19 VITALS — DIASTOLIC BLOOD PRESSURE: 77 MMHG | SYSTOLIC BLOOD PRESSURE: 127 MMHG

## 2022-09-19 VITALS — SYSTOLIC BLOOD PRESSURE: 125 MMHG | DIASTOLIC BLOOD PRESSURE: 53 MMHG

## 2022-09-19 VITALS — SYSTOLIC BLOOD PRESSURE: 127 MMHG | DIASTOLIC BLOOD PRESSURE: 73 MMHG

## 2022-09-19 VITALS — SYSTOLIC BLOOD PRESSURE: 104 MMHG | DIASTOLIC BLOOD PRESSURE: 55 MMHG

## 2022-09-19 LAB
ALP SERPL-CCNC: 75 U/L (ref 50–136)
ALT SERPL W/O P-5'-P-CCNC: 27 U/L (ref 14–59)
AST SERPL-CCNC: 8 U/L (ref 15–37)
BILIRUB SERPL-MCNC: 0.2 MG/DL (ref 0.2–1)
BUN SERPL-MCNC: 9 MG/DL (ref 7–18)
CHLORIDE SERPL-SCNC: 100 MMOL/L (ref 98–107)
CO2 SERPL-SCNC: 28 MMOL/L (ref 21–32)
CREAT SERPL-MCNC: 1.1 MG/DL (ref 0.6–1.3)
GLUCOSE SERPL-MCNC: 139 MG/DL (ref 74–106)
HCT VFR BLD AUTO: 38.6 % (ref 31.2–41.9)
MCH RBC QN AUTO: 26.5 UUG (ref 24.7–32.8)
MCV RBC AUTO: 80.6 FL (ref 75.5–95.3)
PLATELET # BLD AUTO: 392 K/UL (ref 179–408)
POTASSIUM SERPL-SCNC: 4.1 MMOL/L (ref 3.5–5.1)
WS STN SPEC: 7.7 G/DL (ref 6.4–8.2)

## 2022-09-19 RX ADMIN — PANTOPRAZOLE SODIUM SCH MG: 40 TABLET, DELAYED RELEASE ORAL at 06:12

## 2022-09-19 RX ADMIN — DEXTROSE SCH MLS/HR: 50 INJECTION, SOLUTION INTRAVENOUS at 13:40

## 2022-09-19 RX ADMIN — DEXTROSE SCH MLS/HR: 50 INJECTION, SOLUTION INTRAVENOUS at 13:51

## 2022-09-19 RX ADMIN — Medication PRN MG: at 11:37

## 2022-09-19 RX ADMIN — Medication PRN MG: at 09:03

## 2022-09-19 RX ADMIN — DEXTROSE SCH MLS/HR: 50 INJECTION, SOLUTION INTRAVENOUS at 20:09

## 2022-09-19 RX ADMIN — Medication PRN MG: at 06:31

## 2022-09-19 RX ADMIN — Medication PRN MG: at 14:31

## 2022-09-19 RX ADMIN — LEVOTHYROXINE SODIUM SCH MCG: 150 TABLET ORAL at 06:12

## 2022-09-19 RX ADMIN — Medication PRN MG: at 17:15

## 2022-09-19 RX ADMIN — Medication PRN MG: at 21:20

## 2022-09-19 RX ADMIN — NICOTINE SCH MG: 21 PATCH, EXTENDED RELEASE TOPICAL at 12:00

## 2022-09-19 NOTE — NUR
received patient from ER via wheelchair AAOX4/MAEX4.on room air no respiratory distress 
noted breathing even unlabored .noted right and left neck area swelling. oriented patient to 
room and equipment .belonging inventory done and data collected form patient for admission .

## 2022-09-19 NOTE — NUR
42 year old female came in last night with frontal headache, fever, and sore throat. Dx: 
Pharyngitis-intraceable pain. She is alert and orient x4, breathing normal on room air. She 
is on regular diet, however, due to pain on throat patient is not able to swallow regular 
food so a new order placed for Pureed diet. Dietary is aware. She is ambulatory with minimal 
assist. Hep-lock on right arm. Patient taken down today for CT-neck w/contrast. New orders 
for Chlmydia and gnorrhea throat swab. Swab was sent down to lab. New order for Nicotine 
21mg daily due to patient smokes a pack of cigarettes a day. WBC 18.5. Morphine 4mg has been 
provided for pain q4hrs. Patient is in bed resting comfortably.

## 2022-09-19 NOTE — NUR
called epic on call patient temp was 102.1 F via orally. spoked with David Patterson NP  WITH 
ORDERS .

## 2022-09-20 VITALS — DIASTOLIC BLOOD PRESSURE: 59 MMHG | SYSTOLIC BLOOD PRESSURE: 100 MMHG

## 2022-09-20 VITALS — DIASTOLIC BLOOD PRESSURE: 63 MMHG | SYSTOLIC BLOOD PRESSURE: 105 MMHG

## 2022-09-20 VITALS — DIASTOLIC BLOOD PRESSURE: 60 MMHG | SYSTOLIC BLOOD PRESSURE: 101 MMHG

## 2022-09-20 LAB
ALP SERPL-CCNC: 77 U/L (ref 50–136)
ALT SERPL W/O P-5'-P-CCNC: 21 U/L (ref 14–59)
AST SERPL-CCNC: 15 U/L (ref 15–37)
BILIRUB SERPL-MCNC: 0.2 MG/DL (ref 0.2–1)
BUN SERPL-MCNC: 7 MG/DL (ref 7–18)
CHLORIDE SERPL-SCNC: 101 MMOL/L (ref 98–107)
CO2 SERPL-SCNC: 29 MMOL/L (ref 21–32)
CREAT SERPL-MCNC: 0.9 MG/DL (ref 0.6–1.3)
GLUCOSE SERPL-MCNC: 102 MG/DL (ref 74–106)
HCG UR QL: NEGATIVE
HCT VFR BLD AUTO: 35.8 % (ref 31.2–41.9)
MCH RBC QN AUTO: 26.5 UUG (ref 24.7–32.8)
MCV RBC AUTO: 80.3 FL (ref 75.5–95.3)
PLATELET # BLD AUTO: 301 K/UL (ref 179–408)
POTASSIUM SERPL-SCNC: 3.8 MMOL/L (ref 3.5–5.1)
WS STN SPEC: 7.6 G/DL (ref 6.4–8.2)

## 2022-09-20 RX ADMIN — LEVOTHYROXINE SODIUM SCH MCG: 150 TABLET ORAL at 06:18

## 2022-09-20 RX ADMIN — PANTOPRAZOLE SODIUM SCH MG: 40 TABLET, DELAYED RELEASE ORAL at 06:18

## 2022-09-20 RX ADMIN — DEXTROSE SCH MLS/HR: 50 INJECTION, SOLUTION INTRAVENOUS at 03:43

## 2022-09-20 RX ADMIN — DEXTROSE SCH MLS/HR: 50 INJECTION, SOLUTION INTRAVENOUS at 12:15

## 2022-09-20 RX ADMIN — Medication PRN MG: at 06:18

## 2022-09-20 RX ADMIN — NICOTINE SCH MG: 21 PATCH, EXTENDED RELEASE TOPICAL at 09:12

## 2022-09-20 RX ADMIN — Medication PRN MG: at 01:51

## 2022-09-20 NOTE — NUR
IV atb therapy in progress, No A/R noted; oral fluids taken well. Pending throat cultures 
for chlamydia & Gonorrhea done on 9/19/22 (as per Ashtyn, .) it'll take 72hours for 
results to come back.,  This AM (9/20/22) nare culture for influenza and MRSA were sent to 
lab as ordered by MD. with pending results as well,.

## 2023-03-05 ENCOUNTER — HOSPITAL ENCOUNTER (EMERGENCY)
Dept: HOSPITAL 12 - ER | Age: 43
Discharge: HOME | End: 2023-03-05
Payer: MEDICAID

## 2023-03-05 VITALS — WEIGHT: 195 LBS | HEIGHT: 63 IN | BODY MASS INDEX: 34.55 KG/M2

## 2023-03-05 DIAGNOSIS — R45.851: ICD-10-CM

## 2023-03-05 DIAGNOSIS — Z98.84: ICD-10-CM

## 2023-03-05 DIAGNOSIS — R31.29: ICD-10-CM

## 2023-03-05 DIAGNOSIS — R03.0: ICD-10-CM

## 2023-03-05 DIAGNOSIS — F41.9: Primary | ICD-10-CM
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
## 2023-03-05 DIAGNOSIS — F31.9: ICD-10-CM

## 2023-03-05 DIAGNOSIS — F17.210: ICD-10-CM

## 2023-03-05 DIAGNOSIS — Z79.890: ICD-10-CM

## 2023-03-05 DIAGNOSIS — Z79.899: ICD-10-CM

## 2023-03-05 DIAGNOSIS — F10.129: ICD-10-CM

## 2023-03-05 LAB
ALP SERPL-CCNC: 92 U/L (ref 50–136)
ALT SERPL W/O P-5'-P-CCNC: 40 U/L (ref 14–59)
AMPHETAMINES UR QL SCN>1000 NG/ML: NEGATIVE
APAP SERPL-MCNC: < 10 UG/ML (ref 10–30)
APPEARANCE UR: CLEAR
AST SERPL-CCNC: 32 U/L (ref 15–37)
BILIRUB DIRECT SERPL-MCNC: 0.1 MG/DL (ref 0–0.2)
BILIRUB SERPL-MCNC: 0.5 MG/DL (ref 0.2–1)
BILIRUB UR QL STRIP: NEGATIVE
BUN SERPL-MCNC: 10 MG/DL (ref 7–18)
CHLORIDE SERPL-SCNC: 101 MMOL/L (ref 98–107)
CO2 SERPL-SCNC: 24 MMOL/L (ref 21–32)
COARSE GRAN CASTS URNS QL MICRO: (no result) /LPF
COCAINE UR QL SCN: NEGATIVE
COLOR UR: YELLOW
CREAT SERPL-MCNC: 0.7 MG/DL (ref 0.6–1.3)
DEPRECATED SQUAMOUS URNS QL MICRO: (no result) /HPF
ETHANOL SERPL-MCNC: 349 MG/DL (ref 0–0)
GLUCOSE SERPL-MCNC: 110 MG/DL (ref 74–106)
GLUCOSE UR STRIP-MCNC: NEGATIVE MG/DL
HCG UR QL: NEGATIVE
HCT VFR BLD AUTO: 43.6 % (ref 31.2–41.9)
HGB UR QL STRIP: (no result)
KETONES UR STRIP-MCNC: NEGATIVE MG/DL
LEUKOCYTE ESTERASE UR QL STRIP: NEGATIVE
MCH RBC QN AUTO: 26.9 UUG (ref 24.7–32.8)
MCV RBC AUTO: 81.6 FL (ref 75.5–95.3)
NITRITE UR QL STRIP: NEGATIVE
PCP UR QL SCN>25 NG/ML: NEGATIVE
PH UR STRIP: 6 [PH] (ref 5–8)
PLATELET # BLD AUTO: 433 K/UL (ref 179–408)
POTASSIUM SERPL-SCNC: 3.8 MMOL/L (ref 3.5–5.1)
RBC CASTS URNS QL MICRO: (no result) /LPF
SP GR UR STRIP: 1.01 (ref 1–1.03)
THC UR QL SCN>50 NG/ML: POSITIVE
UROBILINOGEN UR STRIP-MCNC: 0.2 E.U./DL
WBC #/AREA URNS HPF: (no result) /HPF
WBC #/AREA URNS HPF: (no result) /HPF (ref 0–3)
WS STN SPEC: 8.2 G/DL (ref 6.4–8.2)

## 2023-03-05 PROCEDURE — G0480 DRUG TEST DEF 1-7 CLASSES: HCPCS

## 2023-03-05 PROCEDURE — A4663 DIALYSIS BLOOD PRESSURE CUFF: HCPCS

## 2023-03-05 NOTE — NUR
Pt states she is not having suicidal thoughts or intentions of following 
through.  Also states she has plans for follow-up care.  Released pt into 
custody of pt's mother.



Gave pt. d/c instructions, pt. verbalized understanding.  Instructed not to 
drive.

## 2023-03-05 NOTE — NUR
Pt wanted to go home AMA.   explained to pt that since her Blood alcohol 
level was so high, she could could not go on her own, but could have a relative 
pick her up.  Pt twice tried to leave but staff and security disuaded her.  She 
is trying to make arrangements for pick-up.

## 2023-04-06 ENCOUNTER — HOSPITAL ENCOUNTER (EMERGENCY)
Dept: HOSPITAL 12 - ER | Age: 43
Discharge: HOME | End: 2023-04-06
Payer: MEDICAID

## 2023-04-06 VITALS — HEIGHT: 63 IN | WEIGHT: 180 LBS | BODY MASS INDEX: 31.89 KG/M2

## 2023-04-06 VITALS — DIASTOLIC BLOOD PRESSURE: 77 MMHG | SYSTOLIC BLOOD PRESSURE: 135 MMHG

## 2023-04-06 DIAGNOSIS — E03.9: ICD-10-CM

## 2023-04-06 DIAGNOSIS — F17.290: ICD-10-CM

## 2023-04-06 DIAGNOSIS — Z79.2: ICD-10-CM

## 2023-04-06 DIAGNOSIS — Z71.6: ICD-10-CM

## 2023-04-06 DIAGNOSIS — Z79.899: ICD-10-CM

## 2023-04-06 DIAGNOSIS — R68.84: Primary | ICD-10-CM

## 2023-04-06 PROCEDURE — 99406 BEHAV CHNG SMOKING 3-10 MIN: CPT

## 2023-04-06 PROCEDURE — A4663 DIALYSIS BLOOD PRESSURE CUFF: HCPCS

## 2023-04-06 PROCEDURE — 99283 EMERGENCY DEPT VISIT LOW MDM: CPT

## 2023-04-06 PROCEDURE — 96372 THER/PROPH/DIAG INJ SC/IM: CPT

## 2023-04-06 NOTE — NUR
Patient discharged to home in stable condition.  Written and verbal after care 
instructions given. 

Patient verbalizes understanding of instructions. Stressed follow up or return 
to ER for worsening s/s.


-------------------------------------------------------------------------------

Addendum: 04/06/23 at 0619 by RONAL

-------------------------------------------------------------------------------

Patient instructed not to drive.

## 2023-05-25 ENCOUNTER — HOSPITAL ENCOUNTER (EMERGENCY)
Dept: HOSPITAL 12 - ER | Age: 43
Discharge: HOME | End: 2023-05-25
Payer: MEDICAID

## 2023-05-25 VITALS — BODY MASS INDEX: 31.89 KG/M2 | WEIGHT: 180 LBS | HEIGHT: 63 IN

## 2023-05-25 VITALS — SYSTOLIC BLOOD PRESSURE: 143 MMHG | DIASTOLIC BLOOD PRESSURE: 90 MMHG

## 2023-05-25 DIAGNOSIS — I25.10: ICD-10-CM

## 2023-05-25 DIAGNOSIS — Z79.899: ICD-10-CM

## 2023-05-25 DIAGNOSIS — F10.129: ICD-10-CM

## 2023-05-25 DIAGNOSIS — E03.9: ICD-10-CM

## 2023-05-25 DIAGNOSIS — F17.210: ICD-10-CM

## 2023-05-25 DIAGNOSIS — R07.89: Primary | ICD-10-CM
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
## 2023-05-25 LAB
BUN SERPL-MCNC: 10 MG/DL (ref 7–18)
CHLORIDE SERPL-SCNC: 104 MMOL/L (ref 98–107)
CO2 SERPL-SCNC: 27 MMOL/L (ref 21–32)
CREAT SERPL-MCNC: 0.7 MG/DL (ref 0.6–1.3)
GLUCOSE SERPL-MCNC: 109 MG/DL (ref 74–106)
HCT VFR BLD AUTO: 41.6 % (ref 31.2–41.9)
MCH RBC QN AUTO: 26.8 UUG (ref 24.7–32.8)
MCV RBC AUTO: 81 FL (ref 75.5–95.3)
PLATELET # BLD AUTO: 437 K/UL (ref 179–408)
POTASSIUM SERPL-SCNC: 4.7 MMOL/L (ref 3.5–5.1)

## 2023-05-25 PROCEDURE — 93005 ELECTROCARDIOGRAM TRACING: CPT

## 2023-05-25 PROCEDURE — 83880 ASSAY OF NATRIURETIC PEPTIDE: CPT

## 2023-05-25 PROCEDURE — 36415 COLL VENOUS BLD VENIPUNCTURE: CPT

## 2023-05-25 PROCEDURE — 80048 BASIC METABOLIC PNL TOTAL CA: CPT

## 2023-05-25 PROCEDURE — A4663 DIALYSIS BLOOD PRESSURE CUFF: HCPCS

## 2023-05-25 PROCEDURE — 85025 COMPLETE CBC W/AUTO DIFF WBC: CPT

## 2023-05-25 PROCEDURE — 84484 ASSAY OF TROPONIN QUANT: CPT

## 2023-05-25 PROCEDURE — 71045 X-RAY EXAM CHEST 1 VIEW: CPT

## 2023-05-25 PROCEDURE — 84443 ASSAY THYROID STIM HORMONE: CPT

## 2023-05-25 PROCEDURE — 99285 EMERGENCY DEPT VISIT HI MDM: CPT

## 2023-05-25 PROCEDURE — 96374 THER/PROPH/DIAG INJ IV PUSH: CPT

## 2023-05-25 NOTE — NUR
Patient discharged to home in stable condition.  Written and verbal after care 
instructions given. 

Patient verbalizes understanding of instructions. Stressed follow up or return 
to ER for worsening s/s.

Patient is a/ox4, NAD noted, patient ambulated with steady gait

## 2023-05-25 NOTE — NUR
BIB [facility] from home with c/o chest pain, headache, anxiety and ETOH 
intoxication, pt to room 2B via EMS gurney. Pt arrives A/OX4, states she drinks 
alcohol on a regular basis. Pt states her chest pain has resolved but she has a 
headache and is feeling anxious.

EKG done and handed to ER physician. Placed on cont cardiac monitor, pulse ox 
and BP.

## 2024-11-26 ENCOUNTER — HOSPITAL ENCOUNTER (EMERGENCY)
Dept: HOSPITAL 12 - ER | Age: 44
Discharge: HOME | End: 2024-11-26
Payer: COMMERCIAL

## 2024-11-26 VITALS — WEIGHT: 150 LBS | BODY MASS INDEX: 26.58 KG/M2 | HEIGHT: 63 IN

## 2024-11-26 VITALS — OXYGEN SATURATION: 95 % | SYSTOLIC BLOOD PRESSURE: 108 MMHG | DIASTOLIC BLOOD PRESSURE: 76 MMHG

## 2024-11-26 DIAGNOSIS — Z88.7: ICD-10-CM

## 2024-11-26 DIAGNOSIS — N64.4: ICD-10-CM

## 2024-11-26 DIAGNOSIS — N12: Primary | ICD-10-CM

## 2024-11-26 DIAGNOSIS — R10.2: ICD-10-CM

## 2024-11-26 DIAGNOSIS — Z87.440: ICD-10-CM

## 2024-11-26 DIAGNOSIS — F17.210: ICD-10-CM

## 2024-11-26 DIAGNOSIS — F31.9: ICD-10-CM

## 2024-11-26 DIAGNOSIS — E66.9: ICD-10-CM

## 2024-11-26 DIAGNOSIS — F41.9: ICD-10-CM

## 2024-11-26 DIAGNOSIS — Z79.899: ICD-10-CM

## 2024-11-26 DIAGNOSIS — Z79.890: ICD-10-CM

## 2024-11-26 DIAGNOSIS — E03.9: ICD-10-CM

## 2024-11-26 LAB
ALBUMIN SERPL BCG-MCNC: 2.9 G/DL (ref 3.4–5)
ALP SERPL-CCNC: 87 U/L (ref 50–136)
ALT SERPL W/O P-5'-P-CCNC: 22 U/L (ref 14–59)
APPEARANCE UR: CLEAR
AST SERPL-CCNC: 14 U/L (ref 15–37)
BASOPHILS # BLD AUTO: 0 K/UL (ref 0–0.2)
BASOPHILS NFR BLD AUTO: 0.1 % (ref 0–2)
BILIRUB DIRECT SERPL-MCNC: 0.2 MG/DL (ref 0–0.2)
BILIRUB SERPL-MCNC: 0.3 MG/DL (ref 0.2–1)
BILIRUB UR QL STRIP: NEGATIVE
BUN SERPL-MCNC: 10 MG/DL (ref 7–18)
CALCIUM SERPL-MCNC: 9.3 MG/DL (ref 8.5–10.1)
CHLORIDE SERPL-SCNC: 102 MMOL/L (ref 98–107)
CO2 SERPL-SCNC: 21 MMOL/L (ref 21–32)
COLOR UR: YELLOW
CREAT SERPL-MCNC: 1 MG/DL (ref 0.6–1.3)
DEPRECATED SQUAMOUS URNS QL MICRO: (no result) /HPF
EOSINOPHIL # BLD AUTO: 0 K/UL (ref 0–0.7)
EOSINOPHIL NFR BLD AUTO: 0 % (ref 0–7)
ERYTHROCYTE [DISTWIDTH] IN BLOOD BY AUTOMATED COUNT: 17.5 % (ref 12.3–17.7)
GLUCOSE SERPL-MCNC: 119 MG/DL (ref 74–106)
GLUCOSE UR STRIP-MCNC: NEGATIVE MG/DL
HCG UR QL: NEGATIVE
HCT VFR BLD AUTO: 31.6 % (ref 31.2–41.9)
HGB BLD-MCNC: 10 G/DL (ref 10.9–14.3)
HGB UR QL STRIP: (no result)
KETONES UR STRIP-MCNC: NEGATIVE MG/DL
LEUKOCYTE ESTERASE UR QL STRIP: (no result)
LYMPHOCYTES # BLD AUTO: 1.2 K/UL (ref 0.8–4.8)
LYMPHOCYTES NFR BLD AUTO: 5.4 % (ref 20.5–51.5)
MANUAL DIF COMMENT BLD-IMP: 1
MCH RBC QN AUTO: 22.9 UUG (ref 24.7–32.8)
MCHC RBC AUTO-ENTMCNC: 32 G/DL (ref 32.3–35.6)
MCV RBC AUTO: 72.6 FL (ref 75.5–95.3)
MONOCYTES # BLD AUTO: 1.8 K/UL (ref 0.1–1.3)
MONOCYTES NFR BLD AUTO: 8.4 % (ref 0–11)
NEUTROPHILS # BLD AUTO: 18.7 K/UL (ref 1.8–8.9)
NEUTROPHILS NFR BLD AUTO: 86.1 % (ref 38.5–71.5)
NITRITE UR QL STRIP: NEGATIVE
PH UR STRIP: 6.5 [PH] (ref 5–8)
PLATELET # BLD AUTO: 224 K/UL (ref 179–408)
POTASSIUM SERPL-SCNC: 3.1 MMOL/L (ref 3.5–5.1)
PROT UR QL STRIP: (no result)
RBC # BLD AUTO: 4.35 MIL/UL (ref 3.63–4.92)
RBC #/AREA URNS HPF: (no result) /HPF (ref 0–3)
SODIUM SERPL-SCNC: 137 MMOL/L (ref 136–145)
SP GR UR STRIP: 1.01 (ref 1–1.03)
UROBILINOGEN UR STRIP-MCNC: 0.2 E.U./DL
WBC # BLD AUTO: 21.7 K/UL (ref 3.8–11.8)
WBC #/AREA URNS HPF: (no result) /HPF
WBC #/AREA URNS HPF: (no result) /HPF (ref 0–3)
WS STN SPEC: 7.6 G/DL (ref 6.4–8.2)
YEAST URNS QL MICRO: (no result) /HPF

## 2024-11-26 PROCEDURE — A9150 MISC/EXPER NON-PRESCRIPT DRU: HCPCS

## 2024-11-26 PROCEDURE — A4663 DIALYSIS BLOOD PRESSURE CUFF: HCPCS

## 2024-11-26 PROCEDURE — A4606 OXYGEN PROBE USED W OXIMETER: HCPCS

## 2024-11-26 RX ADMIN — MORPHINE SULFATE ONE MG: 4 INJECTION, SOLUTION INTRAMUSCULAR; INTRAVENOUS at 16:31

## 2024-11-26 RX ADMIN — PIPERACILLIN SODIUM AND TAZOBACTAM SODIUM ONE MLS/HR: .375; 3 INJECTION, POWDER, LYOPHILIZED, FOR SOLUTION INTRAVENOUS at 14:31

## 2024-11-26 RX ADMIN — MORPHINE SULFATE ONE MG: 4 INJECTION, SOLUTION INTRAMUSCULAR; INTRAVENOUS at 14:31

## 2024-11-26 RX ADMIN — SODIUM CHLORIDE ONE MG: 9 INJECTION, SOLUTION INTRAVENOUS at 14:31

## 2024-11-26 RX ADMIN — KETOROLAC TROMETHAMINE ONE MG: 15 INJECTION, SOLUTION INTRAMUSCULAR; INTRAVENOUS at 16:31

## 2024-11-26 RX ADMIN — SODIUM CHLORIDE ONE ML: 0.9 INJECTION, SOLUTION INTRAVENOUS at 14:17

## 2024-11-26 RX ADMIN — DEXTROSE ONE MLS/HR: 50 INJECTION, SOLUTION INTRAVENOUS at 14:58

## 2024-11-26 RX ADMIN — ACETAMINOPHEN ONE MG: 500 TABLET ORAL at 14:31

## 2025-03-01 ENCOUNTER — HOSPITAL ENCOUNTER (EMERGENCY)
Dept: HOSPITAL 12 - ER | Age: 45
Discharge: HOME | End: 2025-03-01
Payer: COMMERCIAL

## 2025-03-01 VITALS — TEMPERATURE: 98 F | DIASTOLIC BLOOD PRESSURE: 81 MMHG | SYSTOLIC BLOOD PRESSURE: 148 MMHG | OXYGEN SATURATION: 99 %

## 2025-03-01 VITALS — BODY MASS INDEX: 31.01 KG/M2 | HEIGHT: 63 IN | WEIGHT: 175 LBS

## 2025-03-01 DIAGNOSIS — Z88.7: ICD-10-CM

## 2025-03-01 DIAGNOSIS — Z79.890: ICD-10-CM

## 2025-03-01 DIAGNOSIS — Z79.899: ICD-10-CM

## 2025-03-01 DIAGNOSIS — F41.9: ICD-10-CM

## 2025-03-01 DIAGNOSIS — F17.210: ICD-10-CM

## 2025-03-01 DIAGNOSIS — Z98.84: ICD-10-CM

## 2025-03-01 DIAGNOSIS — K08.89: Primary | ICD-10-CM

## 2025-03-01 PROCEDURE — A4606 OXYGEN PROBE USED W OXIMETER: HCPCS

## 2025-03-01 PROCEDURE — A4663 DIALYSIS BLOOD PRESSURE CUFF: HCPCS

## 2025-03-01 RX ADMIN — OXYCODONE HYDROCHLORIDE AND ACETAMINOPHEN ONE TAB: 5; 325 TABLET ORAL at 17:15

## 2025-03-01 RX ADMIN — AMOXICILLIN AND CLAVULANATE POTASSIUM ONE MG: 875; 125 TABLET ORAL at 17:14
